# Patient Record
Sex: MALE | Race: OTHER | NOT HISPANIC OR LATINO | URBAN - METROPOLITAN AREA
[De-identification: names, ages, dates, MRNs, and addresses within clinical notes are randomized per-mention and may not be internally consistent; named-entity substitution may affect disease eponyms.]

---

## 2021-01-07 ENCOUNTER — OUTPATIENT (OUTPATIENT)
Dept: OUTPATIENT SERVICES | Facility: HOSPITAL | Age: 51
LOS: 1 days | End: 2021-01-07

## 2021-01-07 DIAGNOSIS — Z20.822 CONTACT WITH AND (SUSPECTED) EXPOSURE TO COVID-19: ICD-10-CM

## 2022-08-14 ENCOUNTER — OUTPATIENT (OUTPATIENT)
Dept: OUTPATIENT SERVICES | Facility: HOSPITAL | Age: 52
LOS: 1 days | End: 2022-08-14
Payer: MEDICAID

## 2022-08-14 DIAGNOSIS — Z11.52 ENCOUNTER FOR SCREENING FOR COVID-19: ICD-10-CM

## 2022-08-14 LAB
SARS-COV-2 RNA SPEC QL NAA+PROBE: SIGNIFICANT CHANGE UP

## 2023-11-21 ENCOUNTER — EMERGENCY (EMERGENCY)
Facility: HOSPITAL | Age: 53
LOS: 1 days | Discharge: HOME | End: 2023-11-21
Attending: EMERGENCY MEDICINE
Payer: MEDICAID

## 2023-11-21 VITALS
TEMPERATURE: 98 F | DIASTOLIC BLOOD PRESSURE: 93 MMHG | RESPIRATION RATE: 18 BRPM | SYSTOLIC BLOOD PRESSURE: 160 MMHG | OXYGEN SATURATION: 98 % | HEART RATE: 72 BPM

## 2023-11-21 VITALS
RESPIRATION RATE: 17 BRPM | OXYGEN SATURATION: 99 % | SYSTOLIC BLOOD PRESSURE: 180 MMHG | HEART RATE: 75 BPM | TEMPERATURE: 99 F | DIASTOLIC BLOOD PRESSURE: 105 MMHG

## 2023-11-21 LAB
ALBUMIN SERPL ELPH-MCNC: 4.6 G/DL — SIGNIFICANT CHANGE UP (ref 3.3–5)
ALP SERPL-CCNC: 69 U/L — SIGNIFICANT CHANGE UP (ref 40–120)
ALT FLD-CCNC: 42 U/L — SIGNIFICANT CHANGE UP (ref 10–45)
ANION GAP SERPL CALC-SCNC: 11 MMOL/L — SIGNIFICANT CHANGE UP (ref 5–17)
APTT BLD: 34.4 SEC — SIGNIFICANT CHANGE UP (ref 24.5–35.6)
AST SERPL-CCNC: 24 U/L — SIGNIFICANT CHANGE UP (ref 10–40)
BASOPHILS # BLD AUTO: 0.07 K/UL — SIGNIFICANT CHANGE UP (ref 0–0.2)
BASOPHILS NFR BLD AUTO: 0.8 % — SIGNIFICANT CHANGE UP (ref 0–2)
BILIRUB SERPL-MCNC: 0.7 MG/DL — SIGNIFICANT CHANGE UP (ref 0.2–1.2)
BUN SERPL-MCNC: 17 MG/DL — SIGNIFICANT CHANGE UP (ref 7–23)
CALCIUM SERPL-MCNC: 9.6 MG/DL — SIGNIFICANT CHANGE UP (ref 8.4–10.5)
CHLORIDE SERPL-SCNC: 105 MMOL/L — SIGNIFICANT CHANGE UP (ref 96–108)
CO2 SERPL-SCNC: 24 MMOL/L — SIGNIFICANT CHANGE UP (ref 22–31)
CREAT SERPL-MCNC: 0.95 MG/DL — SIGNIFICANT CHANGE UP (ref 0.5–1.3)
EGFR: 96 ML/MIN/1.73M2 — SIGNIFICANT CHANGE UP
EOSINOPHIL # BLD AUTO: 0.31 K/UL — SIGNIFICANT CHANGE UP (ref 0–0.5)
EOSINOPHIL NFR BLD AUTO: 3.7 % — SIGNIFICANT CHANGE UP (ref 0–6)
GLUCOSE SERPL-MCNC: 89 MG/DL — SIGNIFICANT CHANGE UP (ref 70–99)
HCT VFR BLD CALC: 45.9 % — SIGNIFICANT CHANGE UP (ref 39–50)
HGB BLD-MCNC: 14.9 G/DL — SIGNIFICANT CHANGE UP (ref 13–17)
IMM GRANULOCYTES NFR BLD AUTO: 0.8 % — SIGNIFICANT CHANGE UP (ref 0–0.9)
INR BLD: 0.97 RATIO — SIGNIFICANT CHANGE UP (ref 0.85–1.18)
LYMPHOCYTES # BLD AUTO: 2.45 K/UL — SIGNIFICANT CHANGE UP (ref 1–3.3)
LYMPHOCYTES # BLD AUTO: 29.4 % — SIGNIFICANT CHANGE UP (ref 13–44)
MCHC RBC-ENTMCNC: 24.7 PG — LOW (ref 27–34)
MCHC RBC-ENTMCNC: 32.5 GM/DL — SIGNIFICANT CHANGE UP (ref 32–36)
MCV RBC AUTO: 76 FL — LOW (ref 80–100)
MONOCYTES # BLD AUTO: 0.93 K/UL — HIGH (ref 0–0.9)
MONOCYTES NFR BLD AUTO: 11.2 % — SIGNIFICANT CHANGE UP (ref 2–14)
NEUTROPHILS # BLD AUTO: 4.51 K/UL — SIGNIFICANT CHANGE UP (ref 1.8–7.4)
NEUTROPHILS NFR BLD AUTO: 54.1 % — SIGNIFICANT CHANGE UP (ref 43–77)
NRBC # BLD: 0 /100 WBCS — SIGNIFICANT CHANGE UP (ref 0–0)
PLATELET # BLD AUTO: 225 K/UL — SIGNIFICANT CHANGE UP (ref 150–400)
POTASSIUM SERPL-MCNC: 4.1 MMOL/L — SIGNIFICANT CHANGE UP (ref 3.5–5.3)
POTASSIUM SERPL-SCNC: 4.1 MMOL/L — SIGNIFICANT CHANGE UP (ref 3.5–5.3)
PROT SERPL-MCNC: 7.3 G/DL — SIGNIFICANT CHANGE UP (ref 6–8.3)
PROTHROM AB SERPL-ACNC: 10.7 SEC — SIGNIFICANT CHANGE UP (ref 9.5–13)
RBC # BLD: 6.04 M/UL — HIGH (ref 4.2–5.8)
RBC # FLD: 13.1 % — SIGNIFICANT CHANGE UP (ref 10.3–14.5)
SODIUM SERPL-SCNC: 140 MMOL/L — SIGNIFICANT CHANGE UP (ref 135–145)
WBC # BLD: 8.34 K/UL — SIGNIFICANT CHANGE UP (ref 3.8–10.5)
WBC # FLD AUTO: 8.34 K/UL — SIGNIFICANT CHANGE UP (ref 3.8–10.5)

## 2023-11-21 RX ORDER — IBUPROFEN 200 MG
400 TABLET ORAL ONCE
Refills: 0 | Status: COMPLETED | OUTPATIENT
Start: 2023-11-21 | End: 2023-11-21

## 2023-11-21 RX ADMIN — Medication 400 MILLIGRAM(S): at 20:54

## 2023-11-21 NOTE — ED ADULT NURSE NOTE - NSFALLRISKINTERV_ED_ALL_ED
Assistance OOB with selected safe patient handling equipment if applicable/Assistance with ambulation/Communicate fall risk and risk factors to all staff, patient, and family/Monitor gait and stability/Provide visual cue: yellow wristband, yellow gown, etc/Reinforce activity limits and safety measures with patient and family/Call bell, personal items and telephone in reach/Instruct patient to call for assistance before getting out of bed/chair/stretcher/Non-slip footwear applied when patient is off stretcher/[place] to call system/Physically safe environment - no spills, clutter or unnecessary equipment/Purposeful Proactive Rounding/Room/bathroom lighting operational, light cord in reach Assistance OOB with selected safe patient handling equipment if applicable/Assistance with ambulation/Communicate fall risk and risk factors to all staff, patient, and family/Monitor gait and stability/Provide visual cue: yellow wristband, yellow gown, etc/Reinforce activity limits and safety measures with patient and family/Call bell, personal items and telephone in reach/Instruct patient to call for assistance before getting out of bed/chair/stretcher/Non-slip footwear applied when patient is off stretcher/[place] to call system/Physically safe environment - no spills, clutter or unnecessary equipment/Purposeful Proactive Rounding/Room/bathroom lighting operational, light cord in reach Assistance OOB with selected safe patient handling equipment if applicable/Assistance with ambulation/Communicate fall risk and risk factors to all staff, patient, and family/Monitor gait and stability/Provide visual cue: yellow wristband, yellow gown, etc/Reinforce activity limits and safety measures with patient and family/Call bell, personal items and telephone in reach/Instruct patient to call for assistance before getting out of bed/chair/stretcher/Non-slip footwear applied when patient is off stretcher/[place] to call system/Physically safe environment - no spills, clutter or unnecessary equipment/Purposeful Proactive Rounding/Room/bathroom lighting operational, light cord in reach

## 2023-11-21 NOTE — ED PROVIDER NOTE - PHYSICAL EXAMINATION
VITALS:   T(C): 37 (11-21-23 @ 16:55), Max: 37 (11-21-23 @ 16:55)  HR: 75 (11-21-23 @ 16:55) (75 - 75)  BP: 180/105 (11-21-23 @ 16:55) (180/105 - 180/105)  RR: 17 (11-21-23 @ 16:55) (17 - 17)  SpO2: 99% (11-21-23 @ 16:55) (99% - 99%)    GENERAL: NAD, lying in bed comfortably  HEAD:  Atraumatic, Normocephalic  EYES: EOMI, conjunctiva and sclera clear  ENT: Moist mucous membranes  CHEST/LUNG: Clear to auscultation bilaterally; Unlabored respirations  HEART: Regular rate and rhythm; No murmurs, rubs, or gallops  ABDOMEN: BSx4; Soft, nontender, nondistended  EXTREMITIES:  2+ Peripheral Pulses, brisk capillary refill. No clubbing, cyanosis, or edema  NERVOUS SYSTEM:  A&Ox3, no focal deficits   SKIN: No rashes or lesions  Psych: Normal speech, normal behavior, normal affect

## 2023-11-21 NOTE — ED PROVIDER NOTE - CLINICAL SUMMARY MEDICAL DECISION MAKING FREE TEXT BOX
52yo/M w/ HTN coming in from PCP office for RLE pain and swelling. Ddx DVT vs. MSK. No signs of compartment syndrome with pulses, strength and sensation intact. Will order CBC/CMP to rule out anemia and electrolyte derangements. Will order VA duplex of RLE to rule out DVT. Will rule out PTT/PT to rule out coagulopathies. 54yo/M w/ HTN coming in from PCP office for RLE pain and swelling. Ddx DVT vs. MSK. No signs of compartment syndrome with pulses, strength and sensation intact. Will order CBC/CMP to rule out anemia and electrolyte derangements. Will order VA duplex of RLE to rule out DVT. Will rule out PTT/PT to rule out coagulopathies. 54yo/M w/ HTN coming in from PCP office for RLE pain and swelling. Ddx DVT vs. MSK. No signs of compartment syndrome with pulses, strength and sensation intact. Will order CBC/CMP to rule out anemia and electrolyte derangements. Will order VA duplex of RLE to rule out DVT. Will rule out PTT/PT to rule out coagulopathies.    [place]: 53 year old male with pmhx of htn here with RLE pain and swelling. sent by pmd for duplex.   PE: att exam: patient awake alert NAD . LUNGS CTAB no wheeze no crackle. CARD RRR no m/r/g.  Abdomen soft NT ND no rebound no guarding no CVA tenderness. EXT: Lle mild increase in swelling compared to RLE, mild ttp right calf.  CV 2+DP/PT bilaterally. neuro A&Ox3 gait normal.  skin warm and dry no rash  will get labs, coags, doppler, reassess. 52yo/M w/ HTN coming in from PCP office for RLE pain and swelling. Ddx DVT vs. MSK. No signs of compartment syndrome with pulses, strength and sensation intact. Will order CBC/CMP to rule out anemia and electrolyte derangements. Will order VA duplex of RLE to rule out DVT. Will rule out PTT/PT to rule out coagulopathies.    [place]: 53 year old male with pmhx of htn here with RLE pain and swelling. sent by pmd for duplex.   PE: att exam: patient awake alert NAD . LUNGS CTAB no wheeze no crackle. CARD RRR no m/r/g.  Abdomen soft NT ND no rebound no guarding no CVA tenderness. EXT: Lle mild increase in swelling compared to RLE, mild ttp right calf.  CV 2+DP/PT bilaterally. neuro A&Ox3 gait normal.  skin warm and dry no rash  will get labs, coags, doppler, reassess.

## 2023-11-21 NOTE — ED PROVIDER NOTE - PATIENT PORTAL LINK FT
You can access the FollowMyHealth Patient Portal offered by [facility] by registering at the following website: [email or web address]. By joining [employer]’s FollowMyHealth portal, you will also be able to view your health information using other applications (apps) compatible with our system. You can access the FollowMyHealth Patient Portal offered by [facility] by registering at the following website: [email or web address]. By joining [email or web address]’s FollowMyHealth portal, you will also be able to view your health information using other applications (apps) compatible with our system. You can access the FollowMyHealth Patient Portal offered by [facility] by registering at the following website: [email or web address]. By joining [employer]’s FollowMyHealth portal, you will also be able to view your health information using other applications (apps) compatible with our system.

## 2023-11-21 NOTE — ED PROVIDER NOTE - NSFOLLOWUPINSTRUCTIONS_ED_ALL_ED_FT
Follow up with pmd in 3-5 days.   Take ibuprofen 600 mg by mouth every 6 hours as needed for pain.   Keep the leg elevated.   Repeat your doppler of your leg in 5-7 days outpatient.   Return to the ER immediately for worsening symptoms.

## 2023-11-21 NOTE — ED PROVIDER NOTE - OBJECTIVE STATEMENT
52yo/M w/ HTN coming in from PCP office for RLE pain and swelling. Pain started 2d ago, is from the knee down to the feet. Is painful at rest and with movement. No recent travel or sick contacts. No known bleeding disorders. Went to PCP office who was concerned for DVT so was sent to the ED for further imaging. Denies fevers, chills, SOB, CP, abd pain, diarrhea. 54yo/M w/ HTN coming in from PCP office for RLE pain and swelling. Pain started 2d ago, is from the knee down to the feet. Is painful at rest and with movement. No recent travel or sick contacts. No known bleeding disorders. Went to PCP office who was concerned for DVT so was sent to the ED for further imaging. Denies fevers, chills, SOB, CP, abd pain, diarrhea.

## 2023-11-21 NOTE — ED ADULT NURSE NOTE - OBJECTIVE STATEMENT
Pt is a 54yo m coming from home c/o right lower leg pain. Pt states that he has been having pain from his knee r/t right ankle x1 week making ambulation difficult. Pt endorses right leg has become more swollen over the past day. PMH of HTN. PT A,Ox4, ambulatory at baseline. Respirations even and unlabored, abd soft, nondistended and nontender, skin warm, dry, GARRIDO. Denies HA, CP, SOB, n/v/d, fever, chills and urinary symptoms. Stretcher locked in lowest position, appropriate side rails up for safety, pt instructed to call for RN if anything needed. Pt is a 52yo m coming from home c/o right lower leg pain. Pt states that he has been having pain from his knee r/t right ankle x1 week making ambulation difficult. Pt endorses right leg has become more swollen over the past day. PMH of HTN. PT A,Ox4, ambulatory at baseline. Respirations even and unlabored, abd soft, nondistended and nontender, skin warm, dry, GARRIDO. Denies HA, CP, SOB, n/v/d, fever, chills and urinary symptoms. Stretcher locked in lowest position, appropriate side rails up for safety, pt instructed to call for RN if anything needed.

## 2023-11-24 ENCOUNTER — EMERGENCY (EMERGENCY)
Facility: HOSPITAL | Age: 53
LOS: 1 days | Discharge: HOME | End: 2023-11-24
Attending: EMERGENCY MEDICINE
Payer: MEDICAID

## 2023-11-24 VITALS
WEIGHT: 250 LBS | DIASTOLIC BLOOD PRESSURE: 87 MMHG | TEMPERATURE: 98 F | OXYGEN SATURATION: 96 % | RESPIRATION RATE: 19 BRPM | HEART RATE: 67 BPM | HEIGHT: 67 IN | SYSTOLIC BLOOD PRESSURE: 143 MMHG

## 2023-11-24 VITALS
HEART RATE: 74 BPM | OXYGEN SATURATION: 99 % | TEMPERATURE: 98 F | DIASTOLIC BLOOD PRESSURE: 76 MMHG | RESPIRATION RATE: 18 BRPM | SYSTOLIC BLOOD PRESSURE: 138 MMHG

## 2023-11-24 LAB
ALBUMIN SERPL ELPH-MCNC: 3.5 G/DL — SIGNIFICANT CHANGE UP (ref 3.5–5)
ALP SERPL-CCNC: 65 U/L — SIGNIFICANT CHANGE UP (ref 40–120)
ALT FLD-CCNC: 44 U/L DA — SIGNIFICANT CHANGE UP (ref 10–60)
ANION GAP SERPL CALC-SCNC: 3 MMOL/L — LOW (ref 5–17)
AST SERPL-CCNC: 16 U/L — SIGNIFICANT CHANGE UP (ref 10–40)
BASOPHILS # BLD AUTO: 0.07 K/UL — SIGNIFICANT CHANGE UP (ref 0–0.2)
BASOPHILS NFR BLD AUTO: 1 % — SIGNIFICANT CHANGE UP (ref 0–2)
BILIRUB SERPL-MCNC: 0.9 MG/DL — SIGNIFICANT CHANGE UP (ref 0.2–1.2)
BUN SERPL-MCNC: 7 MG/DL — SIGNIFICANT CHANGE UP (ref 7–18)
CALCIUM SERPL-MCNC: 8.3 MG/DL — LOW (ref 8.4–10.5)
CHLORIDE SERPL-SCNC: 110 MMOL/L — HIGH (ref 96–108)
CO2 SERPL-SCNC: 27 MMOL/L — SIGNIFICANT CHANGE UP (ref 22–31)
CREAT SERPL-MCNC: 1.05 MG/DL — SIGNIFICANT CHANGE UP (ref 0.5–1.3)
CRP SERPL-MCNC: 3 MG/L — SIGNIFICANT CHANGE UP
EGFR: 85 ML/MIN/1.73M2 — SIGNIFICANT CHANGE UP
EOSINOPHIL # BLD AUTO: 0.35 K/UL — SIGNIFICANT CHANGE UP (ref 0–0.5)
EOSINOPHIL NFR BLD AUTO: 5 % — SIGNIFICANT CHANGE UP (ref 0–6)
ERYTHROCYTE [SEDIMENTATION RATE] IN BLOOD: 4 MM/HR — SIGNIFICANT CHANGE UP (ref 0–20)
GLUCOSE SERPL-MCNC: 106 MG/DL — HIGH (ref 70–99)
HCT VFR BLD CALC: 42.4 % — SIGNIFICANT CHANGE UP (ref 39–50)
HGB BLD-MCNC: 13.5 G/DL — SIGNIFICANT CHANGE UP (ref 13–17)
IMM GRANULOCYTES NFR BLD AUTO: 1.1 % — HIGH (ref 0–0.9)
LYMPHOCYTES # BLD AUTO: 2.36 K/UL — SIGNIFICANT CHANGE UP (ref 1–3.3)
LYMPHOCYTES # BLD AUTO: 33.8 % — SIGNIFICANT CHANGE UP (ref 13–44)
MCHC RBC-ENTMCNC: 24.6 PG — LOW (ref 27–34)
MCHC RBC-ENTMCNC: 31.8 GM/DL — LOW (ref 32–36)
MCV RBC AUTO: 77.2 FL — LOW (ref 80–100)
MONOCYTES # BLD AUTO: 0.63 K/UL — SIGNIFICANT CHANGE UP (ref 0–0.9)
MONOCYTES NFR BLD AUTO: 9 % — SIGNIFICANT CHANGE UP (ref 2–14)
NEUTROPHILS # BLD AUTO: 3.5 K/UL — SIGNIFICANT CHANGE UP (ref 1.8–7.4)
NEUTROPHILS NFR BLD AUTO: 50.1 % — SIGNIFICANT CHANGE UP (ref 43–77)
NRBC # BLD: 0 /100 WBCS — SIGNIFICANT CHANGE UP (ref 0–0)
PLATELET # BLD AUTO: 198 K/UL — SIGNIFICANT CHANGE UP (ref 150–400)
POTASSIUM SERPL-MCNC: 4 MMOL/L — SIGNIFICANT CHANGE UP (ref 3.5–5.3)
POTASSIUM SERPL-SCNC: 4 MMOL/L — SIGNIFICANT CHANGE UP (ref 3.5–5.3)
PROT SERPL-MCNC: 7.1 G/DL — SIGNIFICANT CHANGE UP (ref 6–8.3)
RBC # BLD: 5.49 M/UL — SIGNIFICANT CHANGE UP (ref 4.2–5.8)
RBC # FLD: 13.2 % — SIGNIFICANT CHANGE UP (ref 10.3–14.5)
SODIUM SERPL-SCNC: 140 MMOL/L — SIGNIFICANT CHANGE UP (ref 135–145)
WBC # BLD: 6.99 K/UL — SIGNIFICANT CHANGE UP (ref 3.8–10.5)
WBC # FLD AUTO: 6.99 K/UL — SIGNIFICANT CHANGE UP (ref 3.8–10.5)

## 2023-11-24 RX ORDER — IBUPROFEN 200 MG
400 TABLET ORAL ONCE
Refills: 0 | Status: COMPLETED | OUTPATIENT
Start: 2023-11-24 | End: 2023-11-24

## 2023-11-24 RX ADMIN — Medication 400 MILLIGRAM(S): at 13:08

## 2023-11-24 NOTE — ED PROVIDER NOTE - PROGRESS NOTE DETAILS
Patient seen by ortho, recommend outpatient f/u.  Patient nontoxic and medically stable for discharge. Results discussed with patient. Return precautions provided and patient understands to return to the ED for concerning or worsening signs and symptoms. Instructed to follow up with ortho and agreeable. Patient's questions answered.

## 2023-11-24 NOTE — ED PROVIDER NOTE - CLINICAL SUMMARY MEDICAL DECISION MAKING FREE TEXT BOX
53 year old male presents for right knee pain for the last 6 days, atraumatic. Ultrasound report noted from 11/21 in [facility]; no evidence of DVT. Will do X-Ray. Patient declined pain meds. 53 year old male presents for right knee pain for the last 6 days, atraumatic. Ultrasound report noted from 11/21 in [facility]; no evidence of DVT. Will do X-Ray. Patient declined pain meds. 53 year old male presents for right knee pain for the last 6 days, atraumatic. Ultrasound report noted from 11/21 in [facility]; no evidence of DVT. Will do X-Ray. Patient declined pain meds.

## 2023-11-24 NOTE — ED PROVIDER NOTE - PROVIDER TOKENS
PROVIDER:[TOKEN:[[id number]]] PROVIDER:[TOKEN:[[id number]]] PROVIDER:[TOKEN:[[id number]]]

## 2023-11-24 NOTE — ED PROVIDER NOTE - OBJECTIVE STATEMENT
53 year old male with no pertinent medical or surgical history presents to ED complaining of right knee pain for 6 days. He denies any injuries. States that he got an ultrasound 2 days ago that was normal, and was told to come to the ED for ortho evaluation. Patient reports taking ibuprofen at 7:30 this morning. NKDA.

## 2023-11-24 NOTE — ED PROVIDER NOTE - PATIENT PORTAL LINK FT
You can access the FollowMyHealth Patient Portal offered by [facility] by registering at the following website: [email or web address]. By joining [employer]’s FollowMyHealth portal, you will also be able to view your health information using other applications (apps) compatible with our system. You can access the FollowMyHealth Patient Portal offered by [facility] by registering at the following website: [email or web address]. By joining [employer]’s FollowMyHealth portal, you will also be able to view your health information using other applications (apps) compatible with our system. You can access the FollowMyHealth Patient Portal offered by [facility] by registering at the following website: [email or web address]. By joining [employer]’s FollowMyHealth portal, you will also be able to view your health information using other applications (apps) compatible with our system.

## 2023-11-24 NOTE — CONSULT NOTE ADULT - SUBJECTIVE AND OBJECTIVE BOX
DELBERT VILLA  [id number]    Orthopedic Consult:    Orthopedic Diagnosis:      DELBERT [id number]  HPI:  Patient is a 52 y/o M, independent ambulator with no reported PMHx who presents today with complaints of atraumatic right knee pain x 6 days. Patient reports going to [facility] on 11/21/23, where a doppler was done of the lower extremity which turned out to be negative. Patient states that the pain is located behind his knee and on the inner aspect of the knee exacerbated with ambulation and bearing weight on that side. Patient admits to taking tylenol and ibuprofen with minimal relief in symptoms. Pt denies Chest pain, fever, chills, recent fall or trauam SOB, dyspnea, paresthesias, N/V/D, abdominal pain, syncope, or pain anywhere else.     Ambulation:     PAST MEDICAL & SURGICAL HISTORY:      FAMILY HISTORY:      Social History:      Allergies    No Known Allergies    Intolerances        Home Medications:      Vital Signs Last 24 Hrs  T(C): 36.8 (24 Nov 2023 10:14), Max: 36.8 (24 Nov 2023 10:14)  T(F): 98.2 (24 Nov 2023 10:14), Max: 98.2 (24 Nov 2023 10:14)  HR: 67 (24 Nov 2023 10:14) (67 - 67)  BP: 143/87 (24 Nov 2023 10:14) (143/87 - 143/87)  BP(mean): --  RR: 19 (24 Nov 2023 10:14) (19 - 19)  SpO2: 96% (24 Nov 2023 10:14) (96% - 96%)    Parameters below as of 24 Nov 2023 10:14  Patient On (Oxygen Delivery Method): room air      I&O's Summary      Physical Exam:  General: Alert and oriented, NAD, resting comfortably  Musculoskeletal:  Right knee: Mild TTP noted over medial joint line. 0-100 degrees flexion with no pain. No effusion noted.   Lower extremities: Calves soft and NTTP b/l. SILT. NVI. (+)EHL/FHL/ADF/APF intact bilaterally    Labs:                        13.5   6.99  )-----------( 198      ( 24 Nov 2023 12:15 )             42.4     11-24    140  |  110<H>  |  7   ----------------------------<  106<H>  4.0   |  27  |  1.05    Ca    8.3<L>      24 Nov 2023 12:15    TPro  7.1  /  Alb  3.5  /  TBili  0.9  /  DBili  x   /  AST  16  /  ALT  44  /  AlkPhos  65  11-24        Radiology:      Impression: Patient is a 53yMale with Right knee arthritis   Plan:  - Recommendation: [ XX ] Conservative management [  ] Surgical intervention  -  WBAT to RLE   -  Pain control with NSAIDS and tylenol PRN    > Ice PRN   - Patient is Follow up with Orthopedic Surgeon, Dr. Oliveira in ONE WEEK at [phone]    DELBERT VILLA  [id number]    Orthopedic Consult:    Orthopedic Diagnosis:      DELBERT [id number]  HPI:  Patient is a 54 y/o M, independent ambulator with no reported PMHx who presents today with complaints of atraumatic right knee pain x 6 days. Patient reports going to [facility] on 11/21/23, where a doppler was done of the lower extremity which turned out to be negative. Patient states that the pain is located behind his knee and on the inner aspect of the knee exacerbated with ambulation and bearing weight on that side. Patient admits to taking tylenol and ibuprofen with minimal relief in symptoms. Pt denies Chest pain, fever, chills, recent fall or trauam SOB, dyspnea, paresthesias, N/V/D, abdominal pain, syncope, or pain anywhere else.     Ambulation:     PAST MEDICAL & SURGICAL HISTORY:      FAMILY HISTORY:      Social History:      Allergies    No Known Allergies    Intolerances        Home Medications:      Vital Signs Last 24 Hrs  T(C): 36.8 (24 Nov 2023 10:14), Max: 36.8 (24 Nov 2023 10:14)  T(F): 98.2 (24 Nov 2023 10:14), Max: 98.2 (24 Nov 2023 10:14)  HR: 67 (24 Nov 2023 10:14) (67 - 67)  BP: 143/87 (24 Nov 2023 10:14) (143/87 - 143/87)  BP(mean): --  RR: 19 (24 Nov 2023 10:14) (19 - 19)  SpO2: 96% (24 Nov 2023 10:14) (96% - 96%)    Parameters below as of 24 Nov 2023 10:14  Patient On (Oxygen Delivery Method): room air      I&O's Summary      Physical Exam:  General: Alert and oriented, NAD, resting comfortably  Musculoskeletal:  Right knee: Mild TTP noted over medial joint line. 0-100 degrees flexion with no pain. No effusion noted.   Lower extremities: Calves soft and NTTP b/l. SILT. NVI. (+)EHL/FHL/ADF/APF intact bilaterally    Labs:                        13.5   6.99  )-----------( 198      ( 24 Nov 2023 12:15 )             42.4     11-24    140  |  110<H>  |  7   ----------------------------<  106<H>  4.0   |  27  |  1.05    Ca    8.3<L>      24 Nov 2023 12:15    TPro  7.1  /  Alb  3.5  /  TBili  0.9  /  DBili  x   /  AST  16  /  ALT  44  /  AlkPhos  65  11-24        Radiology:      Impression: Patient is a 53yMale with Right knee arthritis   Plan:  - Recommendation: [ XX ] Conservative management [  ] Surgical intervention  -  WBAT to RLE   -  Pain control with NSAIDS and tylenol PRN    > Ice PRN   - Patient is Follow up with Orthopedic Surgeon, Dr. Oliveira in ONE WEEK at [phone]    DELBERT VILLA  [id number]    Orthopedic Consult:    Orthopedic Diagnosis:      DELBERT [id number]  HPI:  Patient is a 52 y/o M, independent ambulator with no reported PMHx who presents today with complaints of atraumatic right knee pain x 6 days. Patient reports going to [facility] on 11/21/23, where a doppler was done of the lower extremity which turned out to be negative. Patient states that the pain is located behind his knee and on the inner aspect of the knee exacerbated with ambulation and bearing weight on that side. Patient admits to taking tylenol and ibuprofen with minimal relief in symptoms. Pt denies Chest pain, fever, chills, recent fall or trauam SOB, dyspnea, paresthesias, N/V/D, abdominal pain, syncope, or pain anywhere else.     Ambulation:     PAST MEDICAL & SURGICAL HISTORY:      FAMILY HISTORY:      Social History:      Allergies    No Known Allergies    Intolerances        Home Medications:      Vital Signs Last 24 Hrs  T(C): 36.8 (24 Nov 2023 10:14), Max: 36.8 (24 Nov 2023 10:14)  T(F): 98.2 (24 Nov 2023 10:14), Max: 98.2 (24 Nov 2023 10:14)  HR: 67 (24 Nov 2023 10:14) (67 - 67)  BP: 143/87 (24 Nov 2023 10:14) (143/87 - 143/87)  BP(mean): --  RR: 19 (24 Nov 2023 10:14) (19 - 19)  SpO2: 96% (24 Nov 2023 10:14) (96% - 96%)    Parameters below as of 24 Nov 2023 10:14  Patient On (Oxygen Delivery Method): room air      I&O's Summary      Physical Exam:  General: Alert and oriented, NAD, resting comfortably  Musculoskeletal:  Right knee: Mild TTP noted over medial joint line. 0-100 degrees flexion with no pain. No effusion noted.   Lower extremities: Calves soft and NTTP b/l. SILT. NVI. (+)EHL/FHL/ADF/APF intact bilaterally    Labs:                        13.5   6.99  )-----------( 198      ( 24 Nov 2023 12:15 )             42.4     11-24    140  |  110<H>  |  7   ----------------------------<  106<H>  4.0   |  27  |  1.05    Ca    8.3<L>      24 Nov 2023 12:15    TPro  7.1  /  Alb  3.5  /  TBili  0.9  /  DBili  x   /  AST  16  /  ALT  44  /  AlkPhos  65  11-24        Radiology:      Impression: Patient is a 53yMale with Right knee arthritis   Plan:  - Recommendation: [ XX ] Conservative management [  ] Surgical intervention  -  WBAT to RLE   -  Pain control with NSAIDS and tylenol PRN    > Ice PRN   - Patient is Follow up with Orthopedic Surgeon, Dr. Oliveira in ONE WEEK at [phone]

## 2023-11-24 NOTE — ED PROVIDER NOTE - NSFOLLOWUPINSTRUCTIONS_ED_ALL_ED_FT
Sprain    A sprain is a stretch or tear in one of the tough, fiber-like tissues (ligaments) in your body. This is caused by an injury to the area such as a twisting mechanism. Symptoms include pain, swelling, or bruising. Rest that area over the next several days and slowly resume activity when tolerated. Ice can help with swelling and pain.     SEEK IMMEDIATE MEDICAL CARE IF YOU HAVE ANY OF THE FOLLOWING SYMPTOMS: worsening pain, inability to move that body part, numbness or tingling.     1) Follow up with your orthopedist  2) Return to the ED immediately for new or worsening symptoms   3) Please continue to take any home medications as prescribed  4) Your test results from your ED visit were discussed with you prior to discharge

## 2023-11-24 NOTE — ED PROVIDER NOTE - PHYSICAL EXAMINATION
GEN:   comfortable, in no apparent distress, AOx3  EYES:   PERRL, extra-occular movements intact  HEENT:   airway patent, moist mucosal membranes, uvula midline  CV:  RRR, Pulses- Radial: 2+ bilateral and equal  RESP:   clear to auscultation bilaterally, non-labored, speaking in full sentences  ABD:   soft, non tender, no guarding  :   no cva tenderness  MSK:  No discoloration, erythema, ecchymosis, deformity or swelling. Popliteal, dorsalis pedis and posterior tibial pulses equally strong 2+ bilaterally. Capillary refill in lower extremity < 2 seconds. Full range of motion during flexion, extension and hyperextension. No evidence of locked knee.  No patellar, femur or tibia tenderness on palpation. No crepitus. Able to bear weight. No joint laxity during varus and valgus stress test.      no musculoskeletal tenderness, 5/5 strength, moving all extremities  SKIN:   dry, intact, no rash  NEURO:   AOx3, no focal weakness or loss of sensation, gait normal, GCS 15  PSYCH: calm, cooperative, no apparent risk to self and others GEN:   comfortable, in no apparent distress, AOx3  EYES:   PERRL, extra-occular movements intact  HEENT:   airway patent, moist mucosal membranes, uvula midline  CV:  RRR, Pulses- Radial: 2+ bilateral and equal  RESP:   non-labored, speaking in full sentences  MSK: R knee: No discoloration, erythema, ecchymosis, deformity or swelling. Popliteal, dorsalis pedis and posterior tibial pulses equally strong 2+ bilaterally. Capillary refill in lower extremity < 2 seconds. Full range of motion during flexion, extension and hyperextension. No evidence of locked knee.  No patellar, femur or tibia tenderness on palpation. No crepitus. Able to bear weight. No joint laxity during varus and valgus stress test. No discoloration, erythema, ecchymosis, deformity or swelling. Popliteal, dorsalis pedis and posterior tibial pulses equally strong 2+ bilaterally. Capillary refill in lower extremity < 2 seconds. Full range of motion during flexion, extension and hyperextension. No evidence of locked knee.  No patellar, femur or tibia tenderness on palpation. No crepitus. Able to bear weight. No joint laxity during varus and valgus stress test.    no musculoskeletal tenderness, 5/5 strength, moving all extremities  SKIN:   dry, intact, no rash  NEURO:   AOx3, no focal weakness or loss of sensation, gait normal, GCS 15  PSYCH: calm, cooperative, no apparent risk to self and others

## 2023-11-24 NOTE — ED PROVIDER NOTE - ATTENDING APP SHARED VISIT CONTRIBUTION OF CARE
Seen with ACP.  Complaining of right knee pain sent in by orthopedist for consult.  Patient able to weight-bear no effusion noted no obvious deformity.  X-rays are unremarkable revealing impression is right knee pain Ortho was consulted agree with ACPs assessment history and physical and disposition

## 2023-11-24 NOTE — ED ADULT NURSE NOTE - OBJECTIVE STATEMENT
52 yo male sitting on a chair c/o right knee pain that started 6 days ago. 54 yo male sitting on a chair c/o right knee pain that started 6 days ago.

## 2023-11-24 NOTE — ED ADULT NURSE NOTE - NSFALLUNIVINTERV_ED_ALL_ED
Bed/Stretcher in lowest position, wheels locked, appropriate side rails in place/Call bell, personal items and telephone in reach/Instruct patient to call for assistance before getting out of bed/chair/stretcher/Non-slip footwear applied when patient is off stretcher/[place] to call system/Physically safe environment - no spills, clutter or unnecessary equipment/Purposeful proactive rounding/Room/bathroom lighting operational, light cord in reach Bed/Stretcher in lowest position, wheels locked, appropriate side rails in place/Call bell, personal items and telephone in reach/Instruct patient to call for assistance before getting out of bed/chair/stretcher/Non-slip footwear applied when patient is off stretcher/[place] to call system/Physically safe environment - no spills, clutter or unnecessary equipment/Purposeful proactive rounding/Room/bathroom lighting operational, light cord in reach Bed/Stretcher in lowest position, wheels locked, appropriate side rails in place/Call bell, personal items and telephone in reach/Instruct patient to call for assistance before getting out of bed/chair/stretcher/Non-slip footwear applied when patient is off stretcher/[place] to call system/Physically safe environment - no spills, clutter or unnecessary equipment/Purposeful proactive rounding/Room/bathroom lighting operational, light cord in reach

## 2023-11-24 NOTE — ED PROVIDER NOTE - CARE PROVIDER_API CALL
Ruben Oliveira.  Orthopaedic Surgery  [address] [place] [address], [address] [place] [address]  Phone: [phone]  Fax: [phone]  Follow Up Time:    Ruben Oliveira.  Orthopaedic Surgery  [address] [place] [address], [address] [place] [address]  Phone: [phone]  Fax: [phone]  Follow Up Time:    Ruben Oliveira.  Orthopaedic Surgery  [address] [place] [address], [address] [place] [address]  Phone: [phone]  Fax: [phone]  Follow Up Time:

## 2023-11-24 NOTE — ED PROVIDER NOTE - NS ED ATTENDING STATEMENT MOD
This was a shared visit with the MELLO. I reviewed and verified the documentation and independently performed the documented:
